# Patient Record
Sex: MALE
[De-identification: names, ages, dates, MRNs, and addresses within clinical notes are randomized per-mention and may not be internally consistent; named-entity substitution may affect disease eponyms.]

---

## 2020-01-28 PROBLEM — Z00.00 ENCOUNTER FOR PREVENTIVE HEALTH EXAMINATION: Status: ACTIVE | Noted: 2020-01-28

## 2020-02-04 DIAGNOSIS — Z87.898 PERSONAL HISTORY OF OTHER SPECIFIED CONDITIONS: ICD-10-CM

## 2020-02-04 DIAGNOSIS — Z83.3 FAMILY HISTORY OF DIABETES MELLITUS: ICD-10-CM

## 2020-02-04 DIAGNOSIS — Z82.49 FAMILY HISTORY OF ISCHEMIC HEART DISEASE AND OTHER DISEASES OF THE CIRCULATORY SYSTEM: ICD-10-CM

## 2020-02-04 DIAGNOSIS — Z78.9 OTHER SPECIFIED HEALTH STATUS: ICD-10-CM

## 2020-02-05 ENCOUNTER — APPOINTMENT (OUTPATIENT)
Dept: HEART AND VASCULAR | Facility: CLINIC | Age: 30
End: 2020-02-05
Payer: COMMERCIAL

## 2020-02-05 VITALS
HEART RATE: 58 BPM | DIASTOLIC BLOOD PRESSURE: 84 MMHG | BODY MASS INDEX: 32.72 KG/M2 | RESPIRATION RATE: 14 BRPM | WEIGHT: 206 LBS | SYSTOLIC BLOOD PRESSURE: 126 MMHG | HEIGHT: 66.5 IN

## 2020-02-05 DIAGNOSIS — R06.02 SHORTNESS OF BREATH: ICD-10-CM

## 2020-02-05 DIAGNOSIS — R74.8 ABNORMAL LEVELS OF OTHER SERUM ENZYMES: ICD-10-CM

## 2020-02-05 PROCEDURE — 99205 OFFICE O/P NEW HI 60 MIN: CPT

## 2020-02-05 NOTE — DISCUSSION/SUMMARY
[Deteriorating] : deteriorating [Possible Cardiac Ischemia (Intermd Prob)] : possible cardiac ischemia (intermediate probability) [Non-Cardiac] : non-cardiac chest pain [Hyperlipidemia] : hyperlipidemia [Diet Modification] : diet modification [Stable] : stable [None] : none [Patient] : the patient [Exercise] : exercise [de-identified] : sob

## 2020-02-05 NOTE — REVIEW OF SYSTEMS
[Vomiting] : no vomiting [Nausea] : no nausea [Abdominal Pain] : abdominal pain [Change In The Stool] : no change in stool [Belching] : belching [Change in Appetite] : no change in appetite [Dysphagia] : no dysphagia [Bloating] : bloating [Negative] : Heme/Lymph

## 2020-02-05 NOTE — PHYSICAL EXAM
[General Appearance - Well Nourished] : well nourished [General Appearance - Well Developed] : well developed [Normal Appearance] : normal appearance [Well Groomed] : well groomed [No Deformities] : no deformities [Normal Conjunctiva] : the conjunctiva exhibited no abnormalities [General Appearance - In No Acute Distress] : no acute distress [Normal Oral Mucosa] : normal oral mucosa [Eyelids - No Xanthelasma] : the eyelids demonstrated no xanthelasmas [No Oral Cyanosis] : no oral cyanosis [No Oral Pallor] : no oral pallor [Normal Jugular Venous A Waves Present] : normal jugular venous A waves present [No Jugular Venous Keller A Waves] : no jugular venous keller A waves [Normal Jugular Venous V Waves Present] : normal jugular venous V waves present [Murmurs] : no murmurs present [Heart Sounds] : normal S1 and S2 [Heart Rate And Rhythm] : heart rate and rhythm were normal [Exaggerated Use Of Accessory Muscles For Inspiration] : no accessory muscle use [Respiration, Rhythm And Depth] : normal respiratory rhythm and effort [Auscultation Breath Sounds / Voice Sounds] : lungs were clear to auscultation bilaterally [Abdomen Tenderness] : non-tender [Abdomen Soft] : soft [Abdomen Mass (___ Cm)] : no abdominal mass palpated [Abnormal Walk] : normal gait [Nail Clubbing] : no clubbing of the fingernails [Cyanosis, Localized] : no localized cyanosis [Gait - Sufficient For Exercise Testing] : the gait was sufficient for exercise testing [Skin Color & Pigmentation] : normal skin color and pigmentation [Petechial Hemorrhages (___cm)] : no petechial hemorrhages [No Venous Stasis] : no venous stasis [] : no rash [Skin Lesions] : no skin lesions [No Skin Ulcers] : no skin ulcer [No Xanthoma] : no  xanthoma was observed

## 2020-02-05 NOTE — HISTORY OF PRESENT ILLNESS
[FreeTextEntry1] : Torsten De Jesus is a 28 yo male who presents for CV evaluation.  Approximately 3-4 weeks ago, he developed sob while walking his dog.  Within a day or so later, he developed L sided chest cramping like sensation that was continuous but with varying intensity.  He notes having in creased sob with exertion as well.  The chest discomfort symptoms increased with exertion and position changes as well.  After a few days,  primary care eval took place.  ECG was unremarkable (sinus bradycardia).  Blood work revealed mild increase in CPK and and LDL.  Inflammatory markers CRP and ESR were normal.  He was started on omeprazole with some improvement in symptoms.\par \par EXSE took place a week later - symptoms were still present.  EXSE (1/24/2020): nl lv sys fxn; nl harrison fxn; 14:19 min Conor; no ischemia; delayed heart rate recovery - he has cp throughout study.\par \par He is not exercising but active at work.\par \par Lengthy review of clinical presentation, test findings took place.\par \par Recommendations:\par 1. NSAID for 48 hours\par 2. posture and stretching\par 3. CPET\par 4 CTA\par 5. additional blood work

## 2020-02-06 LAB
CRP SERPL HS-MCNC: 0.81 MG/L
TROPONIN I SERPL-MCNC: 0.02 NG/ML
TROPONIN-T, HIGH SENSITIVITY: 6 NG/L

## 2020-02-09 LAB
CK BB SERPL ELPH-CCNC: 0 % (ref 0–?)
CK MB CFR SERPL ELPH: 0 %
CK MM SERPL ELPH-CCNC: 100 %
CREATINE KINASE,TOTAL,SERUM: 267 U/L
MACRO TYPE 1: 0 %
MACRO TYPE 2: 0 %

## 2020-06-30 ENCOUNTER — APPOINTMENT (OUTPATIENT)
Dept: HEART AND VASCULAR | Facility: CLINIC | Age: 30
End: 2020-06-30

## 2021-01-19 ENCOUNTER — TRANSCRIPTION ENCOUNTER (OUTPATIENT)
Age: 31
End: 2021-01-19

## 2021-02-08 ENCOUNTER — TRANSCRIPTION ENCOUNTER (OUTPATIENT)
Age: 31
End: 2021-02-08

## 2021-02-11 ENCOUNTER — TRANSCRIPTION ENCOUNTER (OUTPATIENT)
Age: 31
End: 2021-02-11

## 2021-04-07 ENCOUNTER — TRANSCRIPTION ENCOUNTER (OUTPATIENT)
Age: 31
End: 2021-04-07

## 2021-04-12 ENCOUNTER — NON-APPOINTMENT (OUTPATIENT)
Age: 31
End: 2021-04-12

## 2021-04-12 ENCOUNTER — APPOINTMENT (OUTPATIENT)
Dept: HEART AND VASCULAR | Facility: CLINIC | Age: 31
End: 2021-04-12
Payer: COMMERCIAL

## 2021-04-12 VITALS
DIASTOLIC BLOOD PRESSURE: 68 MMHG | HEIGHT: 66.5 IN | OXYGEN SATURATION: 98 % | BODY MASS INDEX: 33.67 KG/M2 | SYSTOLIC BLOOD PRESSURE: 118 MMHG | HEART RATE: 50 BPM | TEMPERATURE: 97.3 F | RESPIRATION RATE: 14 BRPM | WEIGHT: 212 LBS

## 2021-04-12 DIAGNOSIS — R07.9 CHEST PAIN, UNSPECIFIED: ICD-10-CM

## 2021-04-12 PROCEDURE — 93000 ELECTROCARDIOGRAM COMPLETE: CPT

## 2021-04-12 PROCEDURE — 99072 ADDL SUPL MATRL&STAF TM PHE: CPT

## 2021-04-12 PROCEDURE — 99215 OFFICE O/P EST HI 40 MIN: CPT

## 2021-04-12 RX ORDER — OMEPRAZOLE 20 MG/1
20 TABLET, DELAYED RELEASE ORAL
Refills: 0 | Status: DISCONTINUED | COMMUNITY
End: 2021-04-12

## 2021-04-12 NOTE — DISCUSSION/SUMMARY
[Possible Cardiac Ischemia (Intermd Prob)] : possible cardiac ischemia (intermediate probability) [Non-Cardiac] : non-cardiac chest pain [Deteriorating] : deteriorating [Hyperlipidemia] : hyperlipidemia [Stable] : stable [None] : none [Diet Modification] : diet modification [Exercise] : exercise [Patient] : the patient [de-identified] : sob

## 2021-04-12 NOTE — HISTORY OF PRESENT ILLNESS
[FreeTextEntry1] : Torsten De Jesus returns for semi-urgent follow up.  \par \par He woke up with sharp R sided chest pain that worsened with position changes a few days ago.  He has mild associated sob.  He denies pnd, orthopnea, edema, palp, or loc.\par \par ECG today reveals sinus bradycardia.\par \par EXSE (1/24/2020): nl lv sys fxn; nl harrison fxn; 14:19 min Conor; no ischemia; delayed heart rate recovery - he has cp throughout study.\par \par He is not exercising but active at work.\par \par Lengthy review of clinical hx and results took place. \par \par Recommendations:\par 1. NSAID for 48 hours\par 2. posture and stretching\par 3. CPET\par 4. consider  CTA\par 5. additional blood work

## 2021-04-12 NOTE — PHYSICAL EXAM
[General Appearance - Well Developed] : well developed [Normal Appearance] : normal appearance [Well Groomed] : well groomed [General Appearance - Well Nourished] : well nourished [No Deformities] : no deformities [General Appearance - In No Acute Distress] : no acute distress [Normal Conjunctiva] : the conjunctiva exhibited no abnormalities [Eyelids - No Xanthelasma] : the eyelids demonstrated no xanthelasmas [Normal Oral Mucosa] : normal oral mucosa [No Oral Pallor] : no oral pallor [No Oral Cyanosis] : no oral cyanosis [Normal Jugular Venous A Waves Present] : normal jugular venous A waves present [Normal Jugular Venous V Waves Present] : normal jugular venous V waves present [No Jugular Venous Keller A Waves] : no jugular venous keller A waves [Respiration, Rhythm And Depth] : normal respiratory rhythm and effort [Exaggerated Use Of Accessory Muscles For Inspiration] : no accessory muscle use [Auscultation Breath Sounds / Voice Sounds] : lungs were clear to auscultation bilaterally [Heart Rate And Rhythm] : heart rate and rhythm were normal [Heart Sounds] : normal S1 and S2 [Murmurs] : no murmurs present [Abdomen Soft] : soft [Abdomen Tenderness] : non-tender [Abdomen Mass (___ Cm)] : no abdominal mass palpated [Abnormal Walk] : normal gait [Gait - Sufficient For Exercise Testing] : the gait was sufficient for exercise testing [Nail Clubbing] : no clubbing of the fingernails [Cyanosis, Localized] : no localized cyanosis [Petechial Hemorrhages (___cm)] : no petechial hemorrhages [Skin Color & Pigmentation] : normal skin color and pigmentation [] : no rash [No Venous Stasis] : no venous stasis [Skin Lesions] : no skin lesions [No Skin Ulcers] : no skin ulcer [No Xanthoma] : no  xanthoma was observed [FreeTextEntry1] : tenderness in R pectoralis major region

## 2021-04-12 NOTE — REVIEW OF SYSTEMS
[Joint Pain] : joint pain [Joint Stiffness] : joint stiffness [Muscle Cramps] : muscle cramps [Negative] : Heme/Lymph [Abdominal Pain] : no abdominal pain [Nausea] : no nausea [Vomiting] : no vomiting [Heartburn] : no heartburn [Change in Appetite] : no change in appetite [Change In The Stool] : no change in stool [Dysphagia] : no dysphagia [Joint Swelling] : no joint swelling [Limb Weakness (Paresis)] : no limb weakness [FreeTextEntry2] : upper chest R

## 2021-04-13 LAB
ALBUMIN SERPL ELPH-MCNC: 4.7 G/DL
ALP BLD-CCNC: 80 U/L
ALT SERPL-CCNC: 30 U/L
ANION GAP SERPL CALC-SCNC: 13 MMOL/L
AST SERPL-CCNC: 23 U/L
BASOPHILS # BLD AUTO: 0.02 K/UL
BASOPHILS NFR BLD AUTO: 0.3 %
BILIRUB SERPL-MCNC: 0.6 MG/DL
BUN SERPL-MCNC: 14 MG/DL
CALCIUM SERPL-MCNC: 10.2 MG/DL
CHLORIDE SERPL-SCNC: 103 MMOL/L
CHOLEST SERPL-MCNC: 192 MG/DL
CK SERPL-CCNC: 223 U/L
CO2 SERPL-SCNC: 25 MMOL/L
CREAT SERPL-MCNC: 1.03 MG/DL
EOSINOPHIL # BLD AUTO: 0.14 K/UL
EOSINOPHIL NFR BLD AUTO: 2.4 %
GLUCOSE SERPL-MCNC: 88 MG/DL
HCT VFR BLD CALC: 50.4 %
HDLC SERPL-MCNC: 40 MG/DL
HGB BLD-MCNC: 16.7 G/DL
IMM GRANULOCYTES NFR BLD AUTO: 0.3 %
LDLC SERPL CALC-MCNC: 105 MG/DL
LYMPHOCYTES # BLD AUTO: 1.69 K/UL
LYMPHOCYTES NFR BLD AUTO: 28.5 %
MAN DIFF?: NORMAL
MCHC RBC-ENTMCNC: 30.4 PG
MCHC RBC-ENTMCNC: 33.1 GM/DL
MCV RBC AUTO: 91.8 FL
MONOCYTES # BLD AUTO: 0.51 K/UL
MONOCYTES NFR BLD AUTO: 8.6 %
NEUTROPHILS # BLD AUTO: 3.55 K/UL
NEUTROPHILS NFR BLD AUTO: 59.9 %
NONHDLC SERPL-MCNC: 152 MG/DL
PLATELET # BLD AUTO: 276 K/UL
POTASSIUM SERPL-SCNC: 4.5 MMOL/L
PROT SERPL-MCNC: 7.8 G/DL
RBC # BLD: 5.49 M/UL
RBC # FLD: 11.4 %
SODIUM SERPL-SCNC: 142 MMOL/L
TRIGL SERPL-MCNC: 238 MG/DL
TROPONIN I SERPL-MCNC: <0.01 NG/ML
TROPONIN-T, HIGH SENSITIVITY: 6 NG/L
WBC # FLD AUTO: 5.93 K/UL

## 2021-06-08 ENCOUNTER — APPOINTMENT (OUTPATIENT)
Dept: HEART AND VASCULAR | Facility: CLINIC | Age: 31
End: 2021-06-08
Payer: COMMERCIAL

## 2021-06-08 VITALS
TEMPERATURE: 97.9 F | WEIGHT: 209 LBS | HEIGHT: 68 IN | SYSTOLIC BLOOD PRESSURE: 102 MMHG | OXYGEN SATURATION: 99 % | BODY MASS INDEX: 31.67 KG/M2 | DIASTOLIC BLOOD PRESSURE: 72 MMHG

## 2021-06-08 PROCEDURE — 94621 CARDIOPULM EXERCISE TESTING: CPT

## 2021-06-08 PROCEDURE — 94729 DIFFUSING CAPACITY: CPT

## 2021-06-08 PROCEDURE — 99072 ADDL SUPL MATRL&STAF TM PHE: CPT

## 2021-06-08 PROCEDURE — 94727 GAS DIL/WSHOT DETER LNG VOL: CPT

## 2021-06-08 PROCEDURE — 94010 BREATHING CAPACITY TEST: CPT | Mod: 59

## 2021-07-06 ENCOUNTER — APPOINTMENT (OUTPATIENT)
Dept: HEART AND VASCULAR | Facility: CLINIC | Age: 31
End: 2021-07-06
Payer: COMMERCIAL

## 2021-07-06 DIAGNOSIS — E78.5 HYPERLIPIDEMIA, UNSPECIFIED: ICD-10-CM

## 2021-07-06 DIAGNOSIS — I25.5 ISCHEMIC CARDIOMYOPATHY: ICD-10-CM

## 2021-07-06 PROCEDURE — 99443: CPT

## 2021-07-06 NOTE — DISCUSSION/SUMMARY
[Possible Cardiac Ischemia (Intermd Prob)] : possible cardiac ischemia (intermediate probability) [Non-Cardiac] : non-cardiac symptoms [Deteriorating] : deteriorating [Dietary Modification] : dietary modification [Exercise Regimen] : an exercise regimen [Hyperlipidemia] : hyperlipidemia [Stable] : stable [None] : There are no changes in medication management [Diet Modification] : diet modification [Exercise] : exercise [Patient] : the patient

## 2021-07-06 NOTE — REASON FOR VISIT
[Coronary Artery Disease] : coronary artery disease [FreeTextEntry3] : Anne Marie Gayle [Follow-Up - Clinic] : a clinic follow-up of [Chest Pain] : chest pain [Hyperlipidemia] : hyperlipidemia

## 2021-07-06 NOTE — PHYSICAL EXAM
[General Appearance - Well Developed] : well developed [Normal Appearance] : normal appearance [Well Groomed] : well groomed [General Appearance - Well Nourished] : well nourished [No Deformities] : no deformities [General Appearance - In No Acute Distress] : no acute distress [Normal Conjunctiva] : the conjunctiva exhibited no abnormalities [Eyelids - No Xanthelasma] : the eyelids demonstrated no xanthelasmas [Normal Oral Mucosa] : normal oral mucosa [No Oral Pallor] : no oral pallor [No Oral Cyanosis] : no oral cyanosis [Normal Jugular Venous A Waves Present] : normal jugular venous A waves present [Normal Jugular Venous V Waves Present] : normal jugular venous V waves present [No Jugular Venous Keller A Waves] : no jugular venous keller A waves [Respiration, Rhythm And Depth] : normal respiratory rhythm and effort [Exaggerated Use Of Accessory Muscles For Inspiration] : no accessory muscle use [Auscultation Breath Sounds / Voice Sounds] : lungs were clear to auscultation bilaterally [Heart Rate And Rhythm] : heart rate and rhythm were normal [Heart Sounds] : normal S1 and S2 [Murmurs] : no murmurs present [Abdomen Tenderness] : non-tender [Abdomen Soft] : soft [Abdomen Mass (___ Cm)] : no abdominal mass palpated [Abnormal Walk] : normal gait [Gait - Sufficient For Exercise Testing] : the gait was sufficient for exercise testing [FreeTextEntry1] : tenderness in R pectoralis major region [Nail Clubbing] : no clubbing of the fingernails [Cyanosis, Localized] : no localized cyanosis [Petechial Hemorrhages (___cm)] : no petechial hemorrhages [Skin Color & Pigmentation] : normal skin color and pigmentation [] : no rash [No Venous Stasis] : no venous stasis [Skin Lesions] : no skin lesions [No Skin Ulcers] : no skin ulcer [No Xanthoma] : no  xanthoma was observed

## 2021-07-06 NOTE — HISTORY OF PRESENT ILLNESS
[FreeTextEntry1] : Torsten De Jesus requests telephone follow up.  Consent obtained and recorded.  He is in his car and doctor is in [place], [place]\par \par  He denies cp, sob, pnd, orthopnea, edema, palp, or loc.\par \par EXSE (1/24/2020): nl lv sys fxn; nl harrison fxn; 14:19 min Conor; no ischemia; delayed heart rate recovery - he has cp throughout study.\par \par CPET 6/2021: ischemic myocardial dysfxn; 79% predicted peak VO2\par \par He is not exercising but active at work.\par \par Lengthy review of clinical hx and results took place. \par \par PE from 4/2021 eval.\par \par Recommendations:\par 1. exercise\par 2. Mediterranean diet\par 3. get CTA\par

## 2021-07-08 ENCOUNTER — NON-APPOINTMENT (OUTPATIENT)
Age: 31
End: 2021-07-08